# Patient Record
Sex: FEMALE | Race: WHITE | NOT HISPANIC OR LATINO | Employment: FULL TIME | URBAN - METROPOLITAN AREA
[De-identification: names, ages, dates, MRNs, and addresses within clinical notes are randomized per-mention and may not be internally consistent; named-entity substitution may affect disease eponyms.]

---

## 2024-04-08 ENCOUNTER — OFFICE VISIT (OUTPATIENT)
Dept: URGENT CARE | Facility: CLINIC | Age: 59
End: 2024-04-08
Payer: COMMERCIAL

## 2024-04-08 VITALS
DIASTOLIC BLOOD PRESSURE: 80 MMHG | OXYGEN SATURATION: 97 % | BODY MASS INDEX: 34.53 KG/M2 | WEIGHT: 220 LBS | TEMPERATURE: 98 F | HEART RATE: 72 BPM | SYSTOLIC BLOOD PRESSURE: 125 MMHG | HEIGHT: 67 IN | RESPIRATION RATE: 18 BRPM

## 2024-04-08 DIAGNOSIS — J04.0 LARYNGITIS: Primary | ICD-10-CM

## 2024-04-08 LAB — POC RAPID STREP: NEGATIVE

## 2024-04-08 PROCEDURE — 87880 STREP A ASSAY W/OPTIC: CPT | Performed by: PHYSICIAN ASSISTANT

## 2024-04-08 PROCEDURE — 1036F TOBACCO NON-USER: CPT | Performed by: PHYSICIAN ASSISTANT

## 2024-04-08 PROCEDURE — 99204 OFFICE O/P NEW MOD 45 MIN: CPT | Performed by: PHYSICIAN ASSISTANT

## 2024-04-08 RX ORDER — METOPROLOL SUCCINATE 50 MG/1
50 TABLET, EXTENDED RELEASE ORAL DAILY
COMMUNITY

## 2024-04-08 RX ORDER — AMLODIPINE BESYLATE 5 MG/1
5 TABLET ORAL DAILY
COMMUNITY

## 2024-04-08 RX ORDER — METHYLPREDNISOLONE 4 MG/1
TABLET ORAL
Qty: 21 TABLET | Refills: 0 | Status: SHIPPED | OUTPATIENT
Start: 2024-04-08

## 2024-04-08 RX ORDER — BROMPHENIRAMINE MALEATE, PSEUDOEPHEDRINE HYDROCHLORIDE, AND DEXTROMETHORPHAN HYDROBROMIDE 2; 30; 10 MG/5ML; MG/5ML; MG/5ML
10 SYRUP ORAL 3 TIMES DAILY PRN
Qty: 118 ML | Refills: 0 | Status: SHIPPED | OUTPATIENT
Start: 2024-04-08 | End: 2024-04-13

## 2024-04-08 RX ORDER — HYDROCHLOROTHIAZIDE 25 MG/1
25 TABLET ORAL
COMMUNITY
Start: 2022-07-15

## 2024-04-08 ASSESSMENT — PAIN SCALES - GENERAL: PAINLEVEL: 5

## 2024-04-08 NOTE — PROGRESS NOTES
"Subjective   Patient ID: Aidee Stallings is a 58 y.o. female who presents for Cough (Cough x1 week).  Patient notes sore throat loss of voice cough.  She denies any chest pain or shortness of breath any nausea vomiting diarrhea or abdominal pain.  Patient is going on vacation in 5 days and is concerned about being sick on vacation.  Traveling on an extended trip to [place]    No past medical history on file.      The remainder of the systems were reviewed and are negative unless noted above      Objective   /80   Pulse 72   Temp 36.7 °C (98 °F)   Resp 18   Ht 1.702 m (5' 7\")   Wt 99.8 kg (220 lb)   SpO2 97%   BMI 34.46 kg/m²   Physical Exam  Vitals reviewed.   Constitutional:       General: She is not in acute distress.     Appearance: Normal appearance. She is not toxic-appearing.   HENT:      Head: Normocephalic.      Right Ear: Tympanic membrane and ear canal normal. No tenderness. No mastoid tenderness.      Left Ear: Tympanic membrane and ear canal normal. No tenderness. No mastoid tenderness.      Nose: Congestion and rhinorrhea present.      Mouth/Throat:      Mouth: Mucous membranes are moist.      Pharynx: Oropharynx is clear. Posterior oropharyngeal erythema present.   Eyes:      Conjunctiva/sclera: Conjunctivae normal.      Pupils: Pupils are equal, round, and reactive to light.   Cardiovascular:      Rate and Rhythm: Normal rate and regular rhythm.      Heart sounds: No murmur heard.  Pulmonary:      Effort: No respiratory distress.      Breath sounds: No stridor. No wheezing, rhonchi or rales.   Chest:      Chest wall: No tenderness.   Abdominal:      Tenderness: There is no abdominal tenderness. There is no guarding.   Musculoskeletal:         General: Normal range of motion.   Skin:     General: Skin is warm and dry.      Capillary Refill: Capillary refill takes less than 2 seconds.      Findings: No erythema.   Neurological:      General: No focal deficit " present.      Mental Status: She is alert.         Assessment/Plan   Problem List Items Addressed This Visit       Laryngitis - Primary    Relevant Medications    brompheniramine-pseudoeph-DM 2-30-10 mg/5 mL syrup    methylPREDNISolone (Medrol Dospak) 4 mg tablets    Other Relevant Orders    POCT rapid strep A manually resulted      Strep testing is negative  Discussed with patient that acute laryngitis is almost invariably caused by a viral illness.  Given the longevity of her symptoms and pronounced loss of voice I have sent Medrol pack, also sending Bromfed to help with the cough

## 2024-04-08 NOTE — PATIENT INSTRUCTIONS
Assessment/Plan   Problem List Items Addressed This Visit       Laryngitis - Primary    Relevant Medications    brompheniramine-pseudoeph-DM 2-30-10 mg/5 mL syrup    methylPREDNISolone (Medrol Dospak) 4 mg tablets    Other Relevant Orders    POCT rapid strep A manually resulted      Strep testing is negative  Discussed with patient that acute laryngitis is almost invariably caused by a viral illness.  Given the longevity of her symptoms and pronounced loss of voice I have sent Medrol pack, also sending Bromfed to help with the cough

## 2024-05-29 ENCOUNTER — LAB (OUTPATIENT)
Dept: LAB | Facility: LAB | Age: 59
End: 2024-05-29
Payer: COMMERCIAL

## 2024-05-29 DIAGNOSIS — H61.899 OTHER SPECIFIED DISORDERS OF EXTERNAL EAR, UNSPECIFIED EAR: Primary | ICD-10-CM

## 2024-05-29 DIAGNOSIS — R51.9 HEADACHE, UNSPECIFIED: ICD-10-CM

## 2024-05-29 DIAGNOSIS — M27.9 DISEASE OF JAWS, UNSPECIFIED: ICD-10-CM

## 2024-05-29 DIAGNOSIS — R22.0 LOCALIZED SWELLING, MASS AND LUMP, HEAD: ICD-10-CM

## 2024-05-29 LAB
BUN SERPL-MCNC: 10 MG/DL (ref 6–23)
CREAT SERPL-MCNC: 0.72 MG/DL (ref 0.5–1.05)
EGFRCR SERPLBLD CKD-EPI 2021: >90 ML/MIN/1.73M*2

## 2024-05-29 PROCEDURE — 36415 COLL VENOUS BLD VENIPUNCTURE: CPT

## 2024-05-29 PROCEDURE — 82565 ASSAY OF CREATININE: CPT

## 2024-05-29 PROCEDURE — 84520 ASSAY OF UREA NITROGEN: CPT

## 2024-07-01 ENCOUNTER — LAB (OUTPATIENT)
Dept: LAB | Facility: LAB | Age: 59
End: 2024-07-01
Payer: COMMERCIAL

## 2024-07-01 DIAGNOSIS — M27.2 INFLAMMATORY CONDITIONS OF JAWS: Primary | ICD-10-CM

## 2024-07-01 LAB
ANION GAP SERPL CALC-SCNC: 13 MMOL/L (ref 10–20)
BASOPHILS # BLD AUTO: 0.04 X10*3/UL (ref 0–0.1)
BASOPHILS NFR BLD AUTO: 0.5 %
BUN SERPL-MCNC: 15 MG/DL (ref 6–23)
CALCIUM SERPL-MCNC: 9.8 MG/DL (ref 8.6–10.6)
CHLORIDE SERPL-SCNC: 102 MMOL/L (ref 98–107)
CO2 SERPL-SCNC: 29 MMOL/L (ref 21–32)
CREAT SERPL-MCNC: 0.72 MG/DL (ref 0.5–1.05)
EGFRCR SERPLBLD CKD-EPI 2021: >90 ML/MIN/1.73M*2
EOSINOPHIL # BLD AUTO: 0.14 X10*3/UL (ref 0–0.7)
EOSINOPHIL NFR BLD AUTO: 1.8 %
ERYTHROCYTE [DISTWIDTH] IN BLOOD BY AUTOMATED COUNT: 15.8 % (ref 11.5–14.5)
ERYTHROCYTE [SEDIMENTATION RATE] IN BLOOD BY WESTERGREN METHOD: 41 MM/H (ref 0–30)
EST. AVERAGE GLUCOSE BLD GHB EST-MCNC: 108 MG/DL
GLUCOSE SERPL-MCNC: 104 MG/DL (ref 74–99)
HBA1C MFR BLD: 5.4 %
HCT VFR BLD AUTO: 38.3 % (ref 36–46)
HGB BLD-MCNC: 11.9 G/DL (ref 12–16)
IMM GRANULOCYTES # BLD AUTO: 0.01 X10*3/UL (ref 0–0.7)
IMM GRANULOCYTES NFR BLD AUTO: 0.1 % (ref 0–0.9)
LYMPHOCYTES # BLD AUTO: 2.61 X10*3/UL (ref 1.2–4.8)
LYMPHOCYTES NFR BLD AUTO: 33.5 %
MCH RBC QN AUTO: 27.1 PG (ref 26–34)
MCHC RBC AUTO-ENTMCNC: 31.1 G/DL (ref 32–36)
MCV RBC AUTO: 87 FL (ref 80–100)
MONOCYTES # BLD AUTO: 0.51 X10*3/UL (ref 0.1–1)
MONOCYTES NFR BLD AUTO: 6.6 %
NEUTROPHILS # BLD AUTO: 4.47 X10*3/UL (ref 1.2–7.7)
NEUTROPHILS NFR BLD AUTO: 57.5 %
NRBC BLD-RTO: 0 /100 WBCS (ref 0–0)
PLATELET # BLD AUTO: 352 X10*3/UL (ref 150–450)
POTASSIUM SERPL-SCNC: 4.2 MMOL/L (ref 3.5–5.3)
RBC # BLD AUTO: 4.39 X10*6/UL (ref 4–5.2)
SODIUM SERPL-SCNC: 140 MMOL/L (ref 136–145)
WBC # BLD AUTO: 7.8 X10*3/UL (ref 4.4–11.3)

## 2024-07-01 PROCEDURE — 83036 HEMOGLOBIN GLYCOSYLATED A1C: CPT

## 2024-07-01 PROCEDURE — 85652 RBC SED RATE AUTOMATED: CPT

## 2024-07-01 PROCEDURE — 85025 COMPLETE CBC W/AUTO DIFF WBC: CPT

## 2024-07-01 PROCEDURE — 80048 BASIC METABOLIC PNL TOTAL CA: CPT

## 2024-07-01 PROCEDURE — 36415 COLL VENOUS BLD VENIPUNCTURE: CPT

## 2024-07-23 DIAGNOSIS — M27.2 OSTEOMYELITIS, JAW CHRONIC: Primary | ICD-10-CM

## 2024-07-24 ENCOUNTER — HOSPITAL ENCOUNTER (OUTPATIENT)
Dept: RADIOLOGY | Facility: CLINIC | Age: 59
Discharge: HOME | End: 2024-07-24
Payer: COMMERCIAL

## 2024-07-24 DIAGNOSIS — M27.2 OSTEOMYELITIS, JAW CHRONIC: ICD-10-CM

## 2024-07-24 PROCEDURE — 70486 CT MAXILLOFACIAL W/O DYE: CPT | Performed by: STUDENT IN AN ORGANIZED HEALTH CARE EDUCATION/TRAINING PROGRAM

## 2024-07-24 PROCEDURE — 70486 CT MAXILLOFACIAL W/O DYE: CPT

## 2024-08-22 ENCOUNTER — ANESTHESIA EVENT (OUTPATIENT)
Dept: OPERATING ROOM | Facility: HOSPITAL | Age: 59
End: 2024-08-22
Payer: COMMERCIAL

## 2024-08-22 ENCOUNTER — APPOINTMENT (OUTPATIENT)
Dept: RADIOLOGY | Facility: HOSPITAL | Age: 59
End: 2024-08-22
Payer: COMMERCIAL

## 2024-08-22 ENCOUNTER — HOSPITAL ENCOUNTER (INPATIENT)
Facility: HOSPITAL | Age: 59
LOS: 1 days | Discharge: HOME | DRG: 141 | End: 2024-08-23
Attending: DENTIST | Admitting: STUDENT IN AN ORGANIZED HEALTH CARE EDUCATION/TRAINING PROGRAM
Payer: COMMERCIAL

## 2024-08-22 ENCOUNTER — ANESTHESIA (OUTPATIENT)
Dept: OPERATING ROOM | Facility: HOSPITAL | Age: 59
End: 2024-08-22
Payer: COMMERCIAL

## 2024-08-22 DIAGNOSIS — M86.9 OSTEOMYELITIS, UNSPECIFIED SITE, UNSPECIFIED TYPE (MULTI): Primary | ICD-10-CM

## 2024-08-22 LAB
ALBUMIN SERPL BCP-MCNC: 3.8 G/DL (ref 3.4–5)
ANION GAP SERPL CALC-SCNC: 17 MMOL/L (ref 10–20)
BUN SERPL-MCNC: 12 MG/DL (ref 6–23)
CALCIUM SERPL-MCNC: 9 MG/DL (ref 8.6–10.6)
CHLORIDE SERPL-SCNC: 100 MMOL/L (ref 98–107)
CO2 SERPL-SCNC: 25 MMOL/L (ref 21–32)
CREAT SERPL-MCNC: 0.77 MG/DL (ref 0.5–1.05)
EGFRCR SERPLBLD CKD-EPI 2021: 90 ML/MIN/1.73M*2
ERYTHROCYTE [DISTWIDTH] IN BLOOD BY AUTOMATED COUNT: 14.3 % (ref 11.5–14.5)
GLUCOSE SERPL-MCNC: 120 MG/DL (ref 74–99)
HCT VFR BLD AUTO: 36.4 % (ref 36–46)
HGB BLD-MCNC: 11.5 G/DL (ref 12–16)
MCH RBC QN AUTO: 26.7 PG (ref 26–34)
MCHC RBC AUTO-ENTMCNC: 31.6 G/DL (ref 32–36)
MCV RBC AUTO: 85 FL (ref 80–100)
NRBC BLD-RTO: 0 /100 WBCS (ref 0–0)
PHOSPHATE SERPL-MCNC: 4 MG/DL (ref 2.5–4.9)
PLATELET # BLD AUTO: 348 X10*3/UL (ref 150–450)
POTASSIUM SERPL-SCNC: 4.5 MMOL/L (ref 3.5–5.3)
RBC # BLD AUTO: 4.3 X10*6/UL (ref 4–5.2)
SODIUM SERPL-SCNC: 137 MMOL/L (ref 136–145)
WBC # BLD AUTO: 16.7 X10*3/UL (ref 4.4–11.3)

## 2024-08-22 PROCEDURE — 3700000002 HC GENERAL ANESTHESIA TIME - EACH INCREMENTAL 1 MINUTE: Performed by: DENTIST

## 2024-08-22 PROCEDURE — 2500000004 HC RX 250 GENERAL PHARMACY W/ HCPCS (ALT 636 FOR OP/ED): Performed by: DENTIST

## 2024-08-22 PROCEDURE — 70355 PANORAMIC X-RAY OF JAWS: CPT | Performed by: STUDENT IN AN ORGANIZED HEALTH CARE EDUCATION/TRAINING PROGRAM

## 2024-08-22 PROCEDURE — 2500000001 HC RX 250 WO HCPCS SELF ADMINISTERED DRUGS (ALT 637 FOR MEDICARE OP): Performed by: STUDENT IN AN ORGANIZED HEALTH CARE EDUCATION/TRAINING PROGRAM

## 2024-08-22 PROCEDURE — 80069 RENAL FUNCTION PANEL: CPT | Performed by: STUDENT IN AN ORGANIZED HEALTH CARE EDUCATION/TRAINING PROGRAM

## 2024-08-22 PROCEDURE — 86901 BLOOD TYPING SEROLOGIC RH(D): CPT | Performed by: STUDENT IN AN ORGANIZED HEALTH CARE EDUCATION/TRAINING PROGRAM

## 2024-08-22 PROCEDURE — 2500000004 HC RX 250 GENERAL PHARMACY W/ HCPCS (ALT 636 FOR OP/ED): Performed by: STUDENT IN AN ORGANIZED HEALTH CARE EDUCATION/TRAINING PROGRAM

## 2024-08-22 PROCEDURE — 2500000005 HC RX 250 GENERAL PHARMACY W/O HCPCS: Performed by: DENTIST

## 2024-08-22 PROCEDURE — 88305 TISSUE EXAM BY PATHOLOGIST: CPT | Performed by: STUDENT IN AN ORGANIZED HEALTH CARE EDUCATION/TRAINING PROGRAM

## 2024-08-22 PROCEDURE — 70355 PANORAMIC X-RAY OF JAWS: CPT

## 2024-08-22 PROCEDURE — 2780000003 HC OR 278 NO HCPCS: Performed by: DENTIST

## 2024-08-22 PROCEDURE — 7100000002 HC RECOVERY ROOM TIME - EACH INCREMENTAL 1 MINUTE: Performed by: DENTIST

## 2024-08-22 PROCEDURE — 2500000005 HC RX 250 GENERAL PHARMACY W/O HCPCS

## 2024-08-22 PROCEDURE — 00QM0ZZ REPAIR FACIAL NERVE, OPEN APPROACH: ICD-10-PCS | Performed by: DENTIST

## 2024-08-22 PROCEDURE — 87077 CULTURE AEROBIC IDENTIFY: CPT | Performed by: DENTIST

## 2024-08-22 PROCEDURE — C1729 CATH, DRAINAGE: HCPCS | Performed by: DENTIST

## 2024-08-22 PROCEDURE — 1100000001 HC PRIVATE ROOM DAILY

## 2024-08-22 PROCEDURE — 2500000001 HC RX 250 WO HCPCS SELF ADMINISTERED DRUGS (ALT 637 FOR MEDICARE OP)

## 2024-08-22 PROCEDURE — 3700000001 HC GENERAL ANESTHESIA TIME - INITIAL BASE CHARGE: Performed by: DENTIST

## 2024-08-22 PROCEDURE — 0NHV04Z INSERTION OF INTERNAL FIXATION DEVICE INTO LEFT MANDIBLE, OPEN APPROACH: ICD-10-PCS | Performed by: DENTIST

## 2024-08-22 PROCEDURE — 36415 COLL VENOUS BLD VENIPUNCTURE: CPT | Performed by: STUDENT IN AN ORGANIZED HEALTH CARE EDUCATION/TRAINING PROGRAM

## 2024-08-22 PROCEDURE — 7100000001 HC RECOVERY ROOM TIME - INITIAL BASE CHARGE: Performed by: DENTIST

## 2024-08-22 PROCEDURE — 2500000004 HC RX 250 GENERAL PHARMACY W/ HCPCS (ALT 636 FOR OP/ED): Performed by: ANESTHESIOLOGY

## 2024-08-22 PROCEDURE — 87116 MYCOBACTERIA CULTURE: CPT | Performed by: DENTIST

## 2024-08-22 PROCEDURE — 87102 FUNGUS ISOLATION CULTURE: CPT | Performed by: DENTIST

## 2024-08-22 PROCEDURE — 88305 TISSUE EXAM BY PATHOLOGIST: CPT | Mod: TC,SUR | Performed by: DENTIST

## 2024-08-22 PROCEDURE — 85027 COMPLETE CBC AUTOMATED: CPT | Performed by: STUDENT IN AN ORGANIZED HEALTH CARE EDUCATION/TRAINING PROGRAM

## 2024-08-22 PROCEDURE — C9353 NEURAWRAP NERVE PROTECTOR,CM: HCPCS | Performed by: DENTIST

## 2024-08-22 PROCEDURE — C1776 JOINT DEVICE (IMPLANTABLE): HCPCS | Performed by: DENTIST

## 2024-08-22 PROCEDURE — 3600000008 HC OR TIME - EACH INCREMENTAL 1 MINUTE - PROCEDURE LEVEL THREE: Performed by: DENTIST

## 2024-08-22 PROCEDURE — 2720000007 HC OR 272 NO HCPCS: Performed by: DENTIST

## 2024-08-22 PROCEDURE — 3600000003 HC OR TIME - INITIAL BASE CHARGE - PROCEDURE LEVEL THREE: Performed by: DENTIST

## 2024-08-22 PROCEDURE — 2500000004 HC RX 250 GENERAL PHARMACY W/ HCPCS (ALT 636 FOR OP/ED)

## 2024-08-22 PROCEDURE — 0NTV0ZZ RESECTION OF LEFT MANDIBLE, OPEN APPROACH: ICD-10-PCS | Performed by: DENTIST

## 2024-08-22 PROCEDURE — 88311 DECALCIFY TISSUE: CPT | Performed by: STUDENT IN AN ORGANIZED HEALTH CARE EDUCATION/TRAINING PROGRAM

## 2024-08-22 PROCEDURE — 2500000001 HC RX 250 WO HCPCS SELF ADMINISTERED DRUGS (ALT 637 FOR MEDICARE OP): Performed by: DENTIST

## 2024-08-22 DEVICE — DRILL, KLSM 2.2: Type: IMPLANTABLE DEVICE | Site: MANDIBLE | Status: NON-FUNCTIONAL

## 2024-08-22 DEVICE — IMPLANTABLE DEVICE: Type: IMPLANTABLE DEVICE | Site: MANDIBLE | Status: FUNCTIONAL

## 2024-08-22 RX ORDER — MIDAZOLAM HYDROCHLORIDE 1 MG/ML
INJECTION INTRAMUSCULAR; INTRAVENOUS AS NEEDED
Status: DISCONTINUED | OUTPATIENT
Start: 2024-08-22 | End: 2024-08-22

## 2024-08-22 RX ORDER — IBUPROFEN 600 MG/1
600 TABLET ORAL EVERY 6 HOURS PRN
Status: DISCONTINUED | OUTPATIENT
Start: 2024-08-22 | End: 2024-08-24 | Stop reason: HOSPADM

## 2024-08-22 RX ORDER — LIDOCAINE HYDROCHLORIDE 10 MG/ML
INJECTION INFILTRATION; PERINEURAL AS NEEDED
Status: DISCONTINUED | OUTPATIENT
Start: 2024-08-22 | End: 2024-08-22

## 2024-08-22 RX ORDER — CEFAZOLIN 1 G/1
INJECTION, POWDER, FOR SOLUTION INTRAVENOUS AS NEEDED
Status: DISCONTINUED | OUTPATIENT
Start: 2024-08-22 | End: 2024-08-22

## 2024-08-22 RX ORDER — METOPROLOL SUCCINATE 25 MG/1
50 TABLET, EXTENDED RELEASE ORAL DAILY
Status: DISCONTINUED | OUTPATIENT
Start: 2024-08-23 | End: 2024-08-24 | Stop reason: HOSPADM

## 2024-08-22 RX ORDER — OXYCODONE HYDROCHLORIDE 5 MG/1
5 TABLET ORAL EVERY 6 HOURS PRN
Status: DISCONTINUED | OUTPATIENT
Start: 2024-08-22 | End: 2024-08-24 | Stop reason: HOSPADM

## 2024-08-22 RX ORDER — NALOXONE HYDROCHLORIDE 0.4 MG/ML
0.2 INJECTION, SOLUTION INTRAMUSCULAR; INTRAVENOUS; SUBCUTANEOUS EVERY 5 MIN PRN
Status: DISCONTINUED | OUTPATIENT
Start: 2024-08-22 | End: 2024-08-24 | Stop reason: HOSPADM

## 2024-08-22 RX ORDER — HYDROMORPHONE HYDROCHLORIDE 1 MG/ML
0.5 INJECTION, SOLUTION INTRAMUSCULAR; INTRAVENOUS; SUBCUTANEOUS EVERY 5 MIN PRN
Status: DISCONTINUED | OUTPATIENT
Start: 2024-08-22 | End: 2024-08-22 | Stop reason: HOSPADM

## 2024-08-22 RX ORDER — ONDANSETRON HYDROCHLORIDE 2 MG/ML
4 INJECTION, SOLUTION INTRAVENOUS EVERY 8 HOURS PRN
Status: DISCONTINUED | OUTPATIENT
Start: 2024-08-22 | End: 2024-08-24 | Stop reason: HOSPADM

## 2024-08-22 RX ORDER — SUCCINYLCHOLINE CHLORIDE 20 MG/ML
INJECTION INTRAMUSCULAR; INTRAVENOUS AS NEEDED
Status: DISCONTINUED | OUTPATIENT
Start: 2024-08-22 | End: 2024-08-22

## 2024-08-22 RX ORDER — PROCHLORPERAZINE MALEATE 10 MG
10 TABLET ORAL EVERY 6 HOURS PRN
Status: DISCONTINUED | OUTPATIENT
Start: 2024-08-22 | End: 2024-08-24 | Stop reason: HOSPADM

## 2024-08-22 RX ORDER — PROPOFOL 10 MG/ML
INJECTION, EMULSION INTRAVENOUS AS NEEDED
Status: DISCONTINUED | OUTPATIENT
Start: 2024-08-22 | End: 2024-08-22

## 2024-08-22 RX ORDER — HYDROMORPHONE HYDROCHLORIDE 1 MG/ML
INJECTION, SOLUTION INTRAMUSCULAR; INTRAVENOUS; SUBCUTANEOUS AS NEEDED
Status: DISCONTINUED | OUTPATIENT
Start: 2024-08-22 | End: 2024-08-22

## 2024-08-22 RX ORDER — ONDANSETRON 4 MG/1
4 TABLET, FILM COATED ORAL EVERY 8 HOURS PRN
Status: DISCONTINUED | OUTPATIENT
Start: 2024-08-22 | End: 2024-08-24 | Stop reason: HOSPADM

## 2024-08-22 RX ORDER — DEXAMETHASONE SODIUM PHOSPHATE 10 MG/ML
8 INJECTION INTRAMUSCULAR; INTRAVENOUS EVERY 8 HOURS SCHEDULED
Status: DISCONTINUED | OUTPATIENT
Start: 2024-08-22 | End: 2024-08-24 | Stop reason: HOSPADM

## 2024-08-22 RX ORDER — SODIUM CHLORIDE, SODIUM LACTATE, POTASSIUM CHLORIDE, CALCIUM CHLORIDE 600; 310; 30; 20 MG/100ML; MG/100ML; MG/100ML; MG/100ML
100 INJECTION, SOLUTION INTRAVENOUS CONTINUOUS
Status: DISCONTINUED | OUTPATIENT
Start: 2024-08-22 | End: 2024-08-22 | Stop reason: HOSPADM

## 2024-08-22 RX ORDER — OXYCODONE HYDROCHLORIDE 5 MG/1
5 TABLET ORAL EVERY 4 HOURS PRN
Status: DISCONTINUED | OUTPATIENT
Start: 2024-08-22 | End: 2024-08-22 | Stop reason: HOSPADM

## 2024-08-22 RX ORDER — OXYCODONE HYDROCHLORIDE 5 MG/1
10 TABLET ORAL EVERY 4 HOURS PRN
Status: DISCONTINUED | OUTPATIENT
Start: 2024-08-22 | End: 2024-08-24 | Stop reason: HOSPADM

## 2024-08-22 RX ORDER — GLYCOPYRROLATE 0.2 MG/ML
INJECTION INTRAMUSCULAR; INTRAVENOUS AS NEEDED
Status: DISCONTINUED | OUTPATIENT
Start: 2024-08-22 | End: 2024-08-22

## 2024-08-22 RX ORDER — FENTANYL CITRATE 50 UG/ML
INJECTION, SOLUTION INTRAMUSCULAR; INTRAVENOUS AS NEEDED
Status: DISCONTINUED | OUTPATIENT
Start: 2024-08-22 | End: 2024-08-22

## 2024-08-22 RX ORDER — SODIUM CHLORIDE 0.9 G/100ML
IRRIGANT IRRIGATION AS NEEDED
Status: DISCONTINUED | OUTPATIENT
Start: 2024-08-22 | End: 2024-08-22 | Stop reason: HOSPADM

## 2024-08-22 RX ORDER — SODIUM CHLORIDE, SODIUM LACTATE, POTASSIUM CHLORIDE, CALCIUM CHLORIDE 600; 310; 30; 20 MG/100ML; MG/100ML; MG/100ML; MG/100ML
INJECTION, SOLUTION INTRAVENOUS CONTINUOUS PRN
Status: DISCONTINUED | OUTPATIENT
Start: 2024-08-22 | End: 2024-08-22

## 2024-08-22 RX ORDER — TRIPROLIDINE/PSEUDOEPHEDRINE 2.5MG-60MG
600 TABLET ORAL EVERY 6 HOURS PRN
Status: DISCONTINUED | OUTPATIENT
Start: 2024-08-22 | End: 2024-08-24 | Stop reason: HOSPADM

## 2024-08-22 RX ORDER — ENOXAPARIN SODIUM 100 MG/ML
40 INJECTION SUBCUTANEOUS ONCE
Status: COMPLETED | OUTPATIENT
Start: 2024-08-22 | End: 2024-08-22

## 2024-08-22 RX ORDER — ACETAMINOPHEN 325 MG/1
650 TABLET ORAL EVERY 6 HOURS
Status: DISCONTINUED | OUTPATIENT
Start: 2024-08-22 | End: 2024-08-24 | Stop reason: HOSPADM

## 2024-08-22 RX ORDER — PROCHLORPERAZINE EDISYLATE 5 MG/ML
10 INJECTION INTRAMUSCULAR; INTRAVENOUS EVERY 6 HOURS PRN
Status: DISCONTINUED | OUTPATIENT
Start: 2024-08-22 | End: 2024-08-24 | Stop reason: HOSPADM

## 2024-08-22 RX ORDER — CHLORHEXIDINE GLUCONATE ORAL RINSE 1.2 MG/ML
15 SOLUTION DENTAL 3 TIMES DAILY
Status: DISCONTINUED | OUTPATIENT
Start: 2024-08-22 | End: 2024-08-24 | Stop reason: HOSPADM

## 2024-08-22 RX ORDER — LIDOCAINE HYDROCHLORIDE 10 MG/ML
0.1 INJECTION INFILTRATION; PERINEURAL ONCE
Status: DISCONTINUED | OUTPATIENT
Start: 2024-08-22 | End: 2024-08-22 | Stop reason: HOSPADM

## 2024-08-22 RX ORDER — SODIUM CHLORIDE, SODIUM LACTATE, POTASSIUM CHLORIDE, CALCIUM CHLORIDE 600; 310; 30; 20 MG/100ML; MG/100ML; MG/100ML; MG/100ML
75 INJECTION, SOLUTION INTRAVENOUS CONTINUOUS
Status: DISCONTINUED | OUTPATIENT
Start: 2024-08-22 | End: 2024-08-24 | Stop reason: HOSPADM

## 2024-08-22 RX ORDER — OXYCODONE HYDROCHLORIDE 5 MG/1
10 TABLET ORAL EVERY 4 HOURS PRN
Status: DISCONTINUED | OUTPATIENT
Start: 2024-08-22 | End: 2024-08-22 | Stop reason: HOSPADM

## 2024-08-22 RX ORDER — HYDROMORPHONE HYDROCHLORIDE 1 MG/ML
0.2 INJECTION, SOLUTION INTRAMUSCULAR; INTRAVENOUS; SUBCUTANEOUS EVERY 5 MIN PRN
Status: DISCONTINUED | OUTPATIENT
Start: 2024-08-22 | End: 2024-08-22 | Stop reason: HOSPADM

## 2024-08-22 RX ORDER — OXYMETAZOLINE HCL 0.05 %
2 SPRAY, NON-AEROSOL (ML) NASAL EVERY 12 HOURS PRN
Status: DISCONTINUED | OUTPATIENT
Start: 2024-08-22 | End: 2024-08-24 | Stop reason: HOSPADM

## 2024-08-22 RX ORDER — ONDANSETRON HYDROCHLORIDE 2 MG/ML
4 INJECTION, SOLUTION INTRAVENOUS ONCE AS NEEDED
Status: DISCONTINUED | OUTPATIENT
Start: 2024-08-22 | End: 2024-08-22 | Stop reason: HOSPADM

## 2024-08-22 RX ORDER — ACETAMINOPHEN 160 MG/5ML
650 SOLUTION ORAL EVERY 6 HOURS
Status: DISCONTINUED | OUTPATIENT
Start: 2024-08-22 | End: 2024-08-24 | Stop reason: HOSPADM

## 2024-08-22 RX ORDER — BACITRACIN ZINC 500 UNIT/G
OINTMENT IN PACKET (EA) TOPICAL AS NEEDED
Status: DISCONTINUED | OUTPATIENT
Start: 2024-08-22 | End: 2024-08-22 | Stop reason: HOSPADM

## 2024-08-22 RX ORDER — ONDANSETRON HYDROCHLORIDE 2 MG/ML
INJECTION, SOLUTION INTRAVENOUS AS NEEDED
Status: DISCONTINUED | OUTPATIENT
Start: 2024-08-22 | End: 2024-08-22

## 2024-08-22 RX ORDER — ENOXAPARIN SODIUM 100 MG/ML
40 INJECTION SUBCUTANEOUS EVERY 24 HOURS
Status: DISCONTINUED | OUTPATIENT
Start: 2024-08-23 | End: 2024-08-24 | Stop reason: HOSPADM

## 2024-08-22 RX ORDER — PROCHLORPERAZINE 25 MG/1
25 SUPPOSITORY RECTAL EVERY 12 HOURS PRN
Status: DISCONTINUED | OUTPATIENT
Start: 2024-08-22 | End: 2024-08-24 | Stop reason: HOSPADM

## 2024-08-22 RX ORDER — OXYMETAZOLINE HCL 0.05 %
SPRAY, NON-AEROSOL (ML) NASAL AS NEEDED
Status: DISCONTINUED | OUTPATIENT
Start: 2024-08-22 | End: 2024-08-22

## 2024-08-22 RX ORDER — SODIUM CHLORIDE 9 MG/ML
INJECTION, SOLUTION INTRAVENOUS CONTINUOUS PRN
Status: COMPLETED | OUTPATIENT
Start: 2024-08-22 | End: 2024-08-22

## 2024-08-22 SDOH — SOCIAL STABILITY: SOCIAL INSECURITY: DO YOU FEEL UNSAFE GOING BACK TO THE PLACE WHERE YOU ARE LIVING?: NO

## 2024-08-22 SDOH — HEALTH STABILITY: MENTAL HEALTH: CURRENT SMOKER: 0

## 2024-08-22 SDOH — SOCIAL STABILITY: SOCIAL INSECURITY: ABUSE: ADULT

## 2024-08-22 SDOH — SOCIAL STABILITY: SOCIAL INSECURITY: DOES ANYONE TRY TO KEEP YOU FROM HAVING/CONTACTING OTHER FRIENDS OR DOING THINGS OUTSIDE YOUR HOME?: NO

## 2024-08-22 SDOH — SOCIAL STABILITY: SOCIAL INSECURITY: HAVE YOU HAD ANY THOUGHTS OF HARMING ANYONE ELSE?: NO

## 2024-08-22 SDOH — SOCIAL STABILITY: SOCIAL INSECURITY: DO YOU FEEL ANYONE HAS EXPLOITED OR TAKEN ADVANTAGE OF YOU FINANCIALLY OR OF YOUR PERSONAL PROPERTY?: NO

## 2024-08-22 SDOH — SOCIAL STABILITY: SOCIAL INSECURITY: HAS ANYONE EVER THREATENED TO HURT YOUR FAMILY OR YOUR PETS?: NO

## 2024-08-22 SDOH — SOCIAL STABILITY: SOCIAL INSECURITY: ARE YOU OR HAVE YOU BEEN THREATENED OR ABUSED PHYSICALLY, EMOTIONALLY, OR SEXUALLY BY ANYONE?: NO

## 2024-08-22 SDOH — SOCIAL STABILITY: SOCIAL INSECURITY: HAVE YOU HAD THOUGHTS OF HARMING ANYONE ELSE?: NO

## 2024-08-22 SDOH — SOCIAL STABILITY: SOCIAL INSECURITY: ARE THERE ANY APPARENT SIGNS OF INJURIES/BEHAVIORS THAT COULD BE RELATED TO ABUSE/NEGLECT?: NO

## 2024-08-22 SDOH — SOCIAL STABILITY: SOCIAL INSECURITY: WERE YOU ABLE TO COMPLETE ALL THE BEHAVIORAL HEALTH SCREENINGS?: YES

## 2024-08-22 ASSESSMENT — LIFESTYLE VARIABLES
HOW OFTEN DO YOU HAVE 6 OR MORE DRINKS ON ONE OCCASION: NEVER
HOW MANY STANDARD DRINKS CONTAINING ALCOHOL DO YOU HAVE ON A TYPICAL DAY: PATIENT DOES NOT DRINK
HOW OFTEN DO YOU HAVE A DRINK CONTAINING ALCOHOL: NEVER
AUDIT-C TOTAL SCORE: 0
AUDIT-C TOTAL SCORE: 0
SKIP TO QUESTIONS 9-10: 1

## 2024-08-22 ASSESSMENT — COGNITIVE AND FUNCTIONAL STATUS - GENERAL
MOBILITY SCORE: 24
MOBILITY SCORE: 24
PATIENT BASELINE BEDBOUND: NO
DAILY ACTIVITIY SCORE: 24
DAILY ACTIVITIY SCORE: 24

## 2024-08-22 ASSESSMENT — PAIN - FUNCTIONAL ASSESSMENT
PAIN_FUNCTIONAL_ASSESSMENT: 0-10
PAIN_FUNCTIONAL_ASSESSMENT: UNABLE TO SELF-REPORT
PAIN_FUNCTIONAL_ASSESSMENT: 0-10
PAIN_FUNCTIONAL_ASSESSMENT: UNABLE TO SELF-REPORT
PAIN_FUNCTIONAL_ASSESSMENT: 0-10
PAIN_FUNCTIONAL_ASSESSMENT: UNABLE TO SELF-REPORT
PAIN_FUNCTIONAL_ASSESSMENT: 0-10
PAIN_FUNCTIONAL_ASSESSMENT: UNABLE TO SELF-REPORT
PAIN_FUNCTIONAL_ASSESSMENT: UNABLE TO SELF-REPORT
PAIN_FUNCTIONAL_ASSESSMENT: 0-10
PAIN_FUNCTIONAL_ASSESSMENT: UNABLE TO SELF-REPORT

## 2024-08-22 ASSESSMENT — COLUMBIA-SUICIDE SEVERITY RATING SCALE - C-SSRS
1. IN THE PAST MONTH, HAVE YOU WISHED YOU WERE DEAD OR WISHED YOU COULD GO TO SLEEP AND NOT WAKE UP?: NO
6. HAVE YOU EVER DONE ANYTHING, STARTED TO DO ANYTHING, OR PREPARED TO DO ANYTHING TO END YOUR LIFE?: NO
2. HAVE YOU ACTUALLY HAD ANY THOUGHTS OF KILLING YOURSELF?: NO

## 2024-08-22 ASSESSMENT — PATIENT HEALTH QUESTIONNAIRE - PHQ9
1. LITTLE INTEREST OR PLEASURE IN DOING THINGS: NOT AT ALL
2. FEELING DOWN, DEPRESSED OR HOPELESS: NOT AT ALL
SUM OF ALL RESPONSES TO PHQ9 QUESTIONS 1 & 2: 0

## 2024-08-22 ASSESSMENT — ACTIVITIES OF DAILY LIVING (ADL)
HEARING - LEFT EAR: FUNCTIONAL
FEEDING YOURSELF: INDEPENDENT
HEARING - RIGHT EAR: FUNCTIONAL
TOILETING: INDEPENDENT
LACK_OF_TRANSPORTATION: NO
JUDGMENT_ADEQUATE_SAFELY_COMPLETE_DAILY_ACTIVITIES: YES
WALKS IN HOME: INDEPENDENT
ADEQUATE_TO_COMPLETE_ADL: YES
DRESSING YOURSELF: INDEPENDENT
BATHING: INDEPENDENT
PATIENT'S MEMORY ADEQUATE TO SAFELY COMPLETE DAILY ACTIVITIES?: YES
GROOMING: INDEPENDENT

## 2024-08-22 ASSESSMENT — PAIN SCALES - GENERAL
PAINLEVEL_OUTOF10: 2
PAINLEVEL_OUTOF10: 0 - NO PAIN
PAINLEVEL_OUTOF10: 0 - NO PAIN
PAINLEVEL_OUTOF10: 6
PAINLEVEL_OUTOF10: 4
PAINLEVEL_OUTOF10: 2
PAINLEVEL_OUTOF10: 0 - NO PAIN
PAINLEVEL_OUTOF10: 4
PAINLEVEL_OUTOF10: 5 - MODERATE PAIN
PAINLEVEL_OUTOF10: 2

## 2024-08-22 NOTE — ANESTHESIA PREPROCEDURE EVALUATION
Patient: Aidee Stallings    Procedure Information       Date/Time: 08/22/24 0715    Procedures:       Excision Bone Face (Bilateral)      Mandible Transmandibular Implant Repair (Bilateral)    Location: [facility] OR 15 [facility] [place]    Surgeons: Rahul Saleh DDS            Relevant Problems   No relevant active problems       Clinical information reviewed:   Tobacco  Allergies  Meds   Med Hx  Surg Hx  OB Status  Fam Hx  Soc   Hx        NPO Detail:  NPO/Void Status  Date of Last Liquid: 08/22/24  Time of Last Liquid: 0430  Date of Last Solid: 08/21/24  Time of Last Solid: 2345         Physical Exam    Airway  Mallampati: I  TM distance: >3 FB     Cardiovascular   Rhythm: regular     Dental    Pulmonary   Breath sounds clear to auscultation     Abdominal            Anesthesia Plan    History of general anesthesia?: no  History of complications of general anesthesia?: no    ASA 2     general     The patient is not a current smoker.    intravenous induction   Anesthetic plan and risks discussed with spouse.

## 2024-08-22 NOTE — BRIEF OP NOTE
Date: 2024  OR Location: [facility] OR    Name: Aidee Stallings, : 1965, Age: 59 y.o., MRN: [id number], Sex: female    Diagnosis  Pre-op Diagnosis      * Osteomyelitis, unspecified site, unspecified type (Multi) [M86.9] Post-op Diagnosis     * Osteomyelitis, unspecified site, unspecified type (Multi) [M86.9]     Procedures  Excision Bone Face   - IL EXCISION BONE MANDIBLE    Mandible Transmandibular Implant Repair   - IL RCNSTJ MNDBL XTRORAL W/TRANSOSTEAL BONE PLATE    53919 - Neuroplasty and/or transposition, cranial nerve, except facial nerve    46244 - Nerve wrap, other nerve procedure    Surgeons      * Rahul Saleh - Primary    Resident/Fellow/Other Assistant:  Surgeons and Role:     * Beth Giraldo DMD, MD - Resident - Assisting     * Serafin Cr MD, DDS - Resident - Assisting    Procedure Summary  Anesthesia: General  ASA: II  Anesthesia Staff: Anesthesiologist: Cecilio Culp MD  C-AA: MARVEL Stovall  Estimated Blood Loss: 75mL  Intra-op Medications:   Administrations occurring from 0715 to 1050 on 24:   Medication Name Total Dose   sodium chloride 0.9 % irrigation solution 1,000 mL   sodium chloride 0.9% infusion 130.83 mL   thrombin (Human)-fibrinogen-aprotinin-calcium (Tisseel) 10 mL 10 mL              Anesthesia Record               Intraprocedure I/O Totals          Intake    Remifentanil Drip 0.00 mL    The total shown is the total volume documented since Anesthesia Start was filed.    Total Intake 0 mL          Specimen:   ID Type Source Tests Collected by Time   1 : NECROTIC BONE LEFT MANDIBE Tissue BONE RESECTION SURGICAL PATHOLOGY EXAM Rahul Saleh DDS 2024 0858   A : NECROTIC BONE LEFT MANDIBLE Tissue MANDIBLE LEFT BIOPSY AFB CULTURE/SMEAR, FUNGAL CULTURE/SMEAR, TISSUE/WOUND CULTURE/SMEAR Rahul Saleh DDS 2024 0836        Staff:   Circulator: Mira Iqbal Person: Sandrine          Findings: Necrotic bone of left mandible involving teeth #19, 20,  nerve lateralized and wrapped.    Complications:  None; patient tolerated the procedure well.     Disposition: PACU - hemodynamically stable.  Condition: stable  Specimens Collected:   ID Type Source Tests Collected by Time   1 : NECROTIC BONE LEFT MANDIBE Tissue BONE RESECTION SURGICAL PATHOLOGY EXAM Rahul Saleh DDS 8/22/2024 0030   A : NECROTIC BONE LEFT MANDIBLE Tissue MANDIBLE LEFT BIOPSY AFB CULTURE/SMEAR, FUNGAL CULTURE/SMEAR, TISSUE/WOUND CULTURE/SMEAR Rahul Saleh DDS 8/22/2024 0036     Attending Attestation:     Rahul Saleh  Phone Number: [phone]

## 2024-08-22 NOTE — CARE PLAN
The patient's goals for the shift include get home    The clinical goals for the shift include pain control and safety

## 2024-08-22 NOTE — ANESTHESIA POSTPROCEDURE EVALUATION
Patient: Aidee Stallings    Procedure Summary       Date: 08/22/24 Room / Location: [facility] OR 15 / [facility]    Anesthesia Start: 0719 Anesthesia Stop: 1159    Procedures:       Excision Bone Face (Bilateral)      Mandible Transmandibular Implant Repair (Bilateral) Diagnosis:       Osteomyelitis, unspecified site, unspecified type (Multi)      (Osteomyelitis Unspecified [M86.9])    Surgeons: Rahul Saleh DDS Responsible Provider: Cecilio Culp MD    Anesthesia Type: general ASA Status: 2            Anesthesia Type: general    Vitals Value Taken Time   /66 08/22/24 1147   Temp 37.1 °C (98.8 °F) 08/22/24 1147   Pulse 89 08/22/24 1152   Resp 13 08/22/24 1152   SpO2 94 % 08/22/24 1152   Vitals shown include unfiled device data.    Anesthesia Post Evaluation    Patient location during evaluation: PACU  Patient participation: complete - patient participated  Level of consciousness: awake and alert  Pain management: adequate  Airway patency: patent  Cardiovascular status: acceptable  Respiratory status: acceptable  Hydration status: acceptable  Postoperative Nausea and Vomiting: none        There were no known notable events for this encounter.

## 2024-08-22 NOTE — H&P
"History Of Present Illness  Aidee Stallings is a 59 y.o. female presenting with left mandibular osteomyelitis. Pt presented for evaluation of numbness and swelling on left side of chin. Conducted further evaluation with CT. CT from 5/3/24 was non-diagnostic for osteomyelitis due to large slices. However, pano from 6/27/24 showed osteomyelitis. Pt also had left-sided swelling and paraesthesia consistent with osteomyelitis.     Past Medical History  Past Medical History:   Diagnosis Date    Hypertension        Surgical History  Past Surgical History:   Procedure Laterality Date    ANKLE SURGERY Right     COLONOSCOPY          Social History  She reports that she quit smoking about 30 years ago. Her smoking use included cigarettes. She has never used smokeless tobacco. She reports current alcohol use. She reports that she does not use drugs.    Family History  No family history on file.     Allergies  Patient has no known allergies.    Review of Systems     10- point review of systems discussed with pt. Pertinent positive and negatives as per HPI    Physical Exam  HENT:      Head: Normocephalic and atraumatic.      Comments: CN VII intact b/l  R CN V3 intact, hypoesthesia of L CN V3  No bony defects intraorally  No sinus tract appreciated  intraorally  Slight left submandibular swelling    Cardiovascular:      Comments: Pt is warm and well-perfused.  Pulmonary:      Comments: Breathing comfortably on room air  Skin:     General: Skin is warm and dry.   Neurological:      General: No focal deficit present.      Mental Status: She is alert.   Psychiatric:         Mood and Affect: Mood normal.         Behavior: Behavior normal.          Last Recorded Vitals  Pulse 89, temperature 37.1 °C (98.8 °F), temperature source Temporal, resp. rate 16, height 1.702 m (5' 7\"), weight 96.5 kg (212 lb 11.9 oz), SpO2 98%.    Relevant Results  FINDINGS:  There is diffuse osseous erosion extending from the left  parasymphyseal region " of the mandible, extending along the mandibular  body to the level of the mandibular angle. Erosion is most prominent  along the buccal aspect of the mandible. Multiple dental caries are  noted, most prominent at the left 3rd mandibular molar. Findings are  compatible with mandibular osteomyelitis, likely odontogenic in  etiology. There is mild surrounding soft tissue stranding with  thickening of the left platysma muscle. However, there is no  well-defined fluid collection, to suggest a drainable abscess.      Remainder of the facial bones are unremarkable. The bony orbits are  intact. The orbital contents are unremarkable. Temporomandibular  joints are unremarkable.      Visualized paranasal sinuses are clear. Mastoids are clear.      Limited assessment of intracranial structures demonstrates no acute  intracranial process.      IMPRESSION:  Left mandibular osteomyelitis, likely odontogenic in etiology.  Adjacent phlegmonous change without evidence of a drainable abscess.         Assessment/Plan   Assessment & Plan      Assessment:   Patient is a 59 year old female with no signifIcant PMH with pano and clinical exam concerning for osteomyelitis  of the left mandible. Noted hypoesthesia of left V3.    Plan   Debridement vs resection of left mandible, possible nerve lateralization, possible nerve allograft, extraction of teeth (#18,19,20), placement of plates and screws with Dr. Saleh on 8/22/24.           Grace Steel DDS  [facility] [place] PGY-1  Pager : [phone]

## 2024-08-22 NOTE — OP NOTE
Excision Bone Face (B), Mandible Transmandibular Implant Repair (B) Operative Note     Date: 2024  OR Location: [facility] OR    Name: Aidee Stallings, : 1965, Age: 59 y.o., MRN: [id number], Sex: female    Diagnosis  Pre-op Diagnosis      * Osteomyelitis, unspecified site, unspecified type (Multi) [M86.9] Post-op Diagnosis     * Osteomyelitis, unspecified site, unspecified type (Multi) [M86.9]     Procedures  Excision Bone Face   - VA EXCISION BONE MANDIBLE    Mandible Transmandibular Implant Repair   - VA RCNSTJ MNDBL XTRORAL W/TRANSOSTEAL BONE PLATE    40844 - Neuroplasty and/or transposition, cranial nerve, except facial nerve     49504 - Nerve wrap, other nerve procedure    Surgeons      * Rahul Saleh - Primary    Resident/Fellow/Other Assistant:  Surgeons and Role:     * Beth Giraldo DMD, MD - Resident - Assisting     * Serafin Cr MD, DDS - Resident - Assisting    Procedure Summary  Anesthesia: General  ASA: II  Anesthesia Staff: Anesthesiologist: Cecilio Culp MD  C-AA: MARVEL Stovall  Estimated Blood Loss: 75mL  Intra-op Medications:   Administrations occurring from 0715 to 1050 on 24:   Medication Name Total Dose   sodium chloride 0.9 % irrigation solution 1,000 mL   sodium chloride 0.9% infusion Cannot be calculated   thrombin (Human)-fibrinogen-aprotinin-calcium (Tisseel) 10 mL 10 mL              Anesthesia Record               Intraprocedure I/O Totals          Intake    Remifentanil Drip 90.84 mL    The total shown is the total volume documented since Anesthesia Start was filed.    lactated Ringer's 1500.00 mL    Total Intake 1590.84 mL       Output    Est. Blood Loss 75 mL    Total Output 75 mL       Net    Net Volume 1515.84 mL          Specimen:   ID Type Source Tests Collected by Time   1 : NECROTIC BONE LEFT MANDIBE Tissue BONE RESECTION SURGICAL PATHOLOGY EXAM Rahul Saleh DDS 2024 0858   A : NECROTIC BONE LEFT MANDIBLE Tissue MANDIBLE LEFT BIOPSY  AFB CULTURE/SMEAR, FUNGAL CULTURE/SMEAR, TISSUE/WOUND CULTURE/SMEAR Rahul Saleh, DDS 8/22/2024 0836        Staff:   Circulator: Mira Iqbal Person: Sandrine         Drains and/or Catheters:   Closed/Suction Drain Left Neck (Active)   Dressing Status Clean;Dry;Occlusive 08/22/24 1147   Drainage Appearance Dark red 08/22/24 1147   Status To bulb suction 08/22/24 1147   Output (mL) 20 mL 08/22/24 1300       [REMOVED] Open Drain Left;Anterior Neck (Removed)       [REMOVED] Urethral Catheter Non-latex 16 Fr. (Removed)       Tourniquet Times:         Implants:  Implants       Type Name Action Serial No.       implant, mandible Implanted [id number]      cutting guide, w/planning, recon Used, Not Implanted [id number]      cutting guide, recon, extra, small Used, Not Implanted [id number]      2.7x9mm screw Implanted       2.7x17mm screw Implanted       2.7x9mm screw Wasted      Screw DRILL, KLSM 2.2 - [id number] Used, Not Implanted      Neuro Interventional Implant NERVE PROTECTOR, AXOGUARD, 5MM X 40MM - [id number] Implanted               Findings: Necrotic bone of left mandible involving teeth #19, 20, inferior alveolar nerve lateralized and wrapped.     Indications: Aidee Stallings is an 59 y.o. female who is having surgery for Osteomyelitis Unspecified [M86.9].    The patient was seen in the preoperative area. The risks, benefits, complications, treatment options, non-operative alternatives, expected recovery and outcomes were discussed with the patient. The possibilities of reaction to medication, pulmonary aspiration, injury to surrounding structures, bleeding, recurrent infection, the need for additional procedures, failure to diagnose a condition, and creating a complication requiring transfusion or operation were discussed with the patient. The patient concurred with the proposed plan, giving informed consent.  The site of surgery was properly noted/marked if necessary per policy. The patient has been actively  warmed in preoperative area. Preoperative antibiotics have been ordered and given within 1 hours of incision. Venous thrombosis prophylaxis have been ordered including bilateral sequential compression devices and chemical prophylaxis    Procedure Details:   The patient was greeted in the pre-op holding area, where all preoperative risks and complications were reviewed. Later, the patient was brought into the operating room by the anesthesia staff and was placed in the supine position. A time out was performed to confirmed patient's identity and the procedure to be performed. The patient was then induced for general anesthesia and intubated with a nasal endotracheal tube. Following intubation, the nasal endotracheal tube was secured by the oral and maxillofacial surgery team. The patient was prepped and draped in standard oral and maxillofacial surgery fashion. A preincision pause was performed. Attention was then turned to the left neck. A 15 blade was used to create a skin incision from angle to just past midline 2cm inferior to the inferior border of the mandible.  The incision was carried through through fat, and blunt dissection was performed to the level of the platysma. The plastysma was elevated with a mosquito, nerve tested, and divided with sharp incision. The dissection continued through the neck layer by layer with blunt undermining, nerve testing, and sharp incision down to the level of the submandibular gland. The facial vein and artery were encountered, divided, and tied with 2-0 silk ties. The muscular sling of the mandible was divided with bovie down to bone and a periosteal elevator was used to dissect down to inferior border of mandible, and elevated a full thickness flap exposing the necrotic bone of the left mandibular body. A custom plate was adapted to the mandible and predictive holes were drilled. The custom plate was then removed from the mandible. The inferior alveolar nerve was lateralized  using a surgical handpiece to create a window in the lateral bony cortex and free it from the bone. An oval bur on surgical handpiece was used to remove any necrotic bone and a large piece of necrotic bone and tissue were sent for both cultures and pathology. After removing the necrotic bone the distal and proximal portions of the bone were noted to be bleeding and healthy. Intraorally, an incision was made distal to tooth #19 over the area of debridement, extending anteriorly to teeth #19, 20. Teeth #19, 20 were elevated and delivered with forceps. The bony edges were smoothed with surgical handpiece and bone file to ensure a soft contour. Sites were irrigated and the intraoral wound was closed with running and horizontal interrupted 3-0 vicryl suture followed by dermabond and tisseal. In the neck a layer of Tisseel was placed along the corresponding area of the now closed tissue as well. The lateralized nerve at this point was noted to be torn anteriorly and posteriorly and two tacking sutures were placed on the proximal and distal segments of the nerve and their corresponding nerve stumps with 7-0 nylon suture. Following reapproximation of the nerve ends an axogen nerve wrap was used on the proximal and distal segments of the nerve and secured into place with clips. The custom plate was again applied to the mandible and secured with all of the screw holes. Flosseal placed around the mandible. 3-0 vicryl used to reapproximate the periosteum and muscle over the plate. A 10 Sammarinese flat ALEX drain was placed and secured with a 2-0 prolene suture. The platysma was closed with a running 3-0 vicryl suture. The dermal layer was closed with interrupted 4-0 vicryl sutures and the skin was closed with 5-0 fast running suture. The wound was dressed with bacitracin.     The oral cavity was suctioned and throat pack removed. Care of the patient was then transferred over to the anesthesia team. The patient was emerged from  anesthesia, extubated and was taken to PACU in stable condition.      Dr. Saleh was present for all critical portions of the case.    Complications:  None; patient tolerated the procedure well.    Disposition: PACU - hemodynamically stable.  Condition: stable     Additional Details: None    Attending Attestation:     Rahul Saleh  Phone Number: [phone]

## 2024-08-22 NOTE — SIGNIFICANT EVENT
"Postop Note    S  Patient is a 58 y/o female who is POD 0 s/p L mandibular resection with placement of reconstruction plate and neuroplasty with nerve wrap with Dr. Saleh. Patient reports well controlled pain in the post-operative period. Patient reports ambulation, urination, and PO fluid intake.     O  Physical Exam  Physical Exam  Constitutional:       Appearance: Normal appearance.   HENT:      Head: Normocephalic.      Comments: - CN VII intact b/l  - R CN V3 intact, slight hypoesthesia of L CN V3  - Intraoral wounds c/d/l   - Extraoral wounds c/d/I   - Occlusion stable and reproducible       Eyes:      Extraocular Movements: Extraocular movements intact.      Pupils: Pupils are equal, round, and reactive to light.   Neurological:      Mental Status: She is alert.           Last Recorded Vitals  Blood pressure 128/81, pulse 69, temperature 35.9 °C (96.6 °F), temperature source Temporal, resp. rate 20, height 1.702 m (5' 7\"), weight 92.6 kg (204 lb 2.3 oz), SpO2 94%.    Relevant Results  No visits with results within 2 Day(s) from this visit.   Latest known visit with results is:   Lab on 07/01/2024   Component Date Value Ref Range Status    WBC 07/01/2024 7.8  4.4 - 11.3 x10*3/uL Final    nRBC 07/01/2024 0.0  0.0 - 0.0 /100 WBCs Final    RBC 07/01/2024 4.39  4.00 - 5.20 x10*6/uL Final    Hemoglobin 07/01/2024 11.9 (L)  12.0 - 16.0 g/dL Final    Hematocrit 07/01/2024 38.3  36.0 - 46.0 % Final    MCV 07/01/2024 87  80 - 100 fL Final    MCH 07/01/2024 27.1  26.0 - 34.0 pg Final    MCHC 07/01/2024 31.1 (L)  32.0 - 36.0 g/dL Final    RDW 07/01/2024 15.8 (H)  11.5 - 14.5 % Final    Platelets 07/01/2024 352  150 - 450 x10*3/uL Final    Neutrophils % 07/01/2024 57.5  40.0 - 80.0 % Final    Immature Granulocytes %, Automated 07/01/2024 0.1  0.0 - 0.9 % Final    Immature Granulocyte Count (IG) includes promyelocytes, myelocytes and metamyelocytes but does not include bands. Percent differential counts (%) should be " interpreted in the context of the absolute cell counts (cells/UL).    Lymphocytes % 07/01/2024 33.5  13.0 - 44.0 % Final    Monocytes % 07/01/2024 6.6  2.0 - 10.0 % Final    Eosinophils % 07/01/2024 1.8  0.0 - 6.0 % Final    Basophils % 07/01/2024 0.5  0.0 - 2.0 % Final    Neutrophils Absolute 07/01/2024 4.47  1.20 - 7.70 x10*3/uL Final    Percent differential counts (%) should be interpreted in the context of the absolute cell counts (cells/uL).    Immature Granulocytes Absolute, Au* 07/01/2024 0.01  0.00 - 0.70 x10*3/uL Final    Lymphocytes Absolute 07/01/2024 2.61  1.20 - 4.80 x10*3/uL Final    Monocytes Absolute 07/01/2024 0.51  0.10 - 1.00 x10*3/uL Final    Eosinophils Absolute 07/01/2024 0.14  0.00 - 0.70 x10*3/uL Final    Basophils Absolute 07/01/2024 0.04  0.00 - 0.10 x10*3/uL Final    Glucose 07/01/2024 104 (H)  74 - 99 mg/dL Final    Sodium 07/01/2024 140  136 - 145 mmol/L Final    Potassium 07/01/2024 4.2  3.5 - 5.3 mmol/L Final    Chloride 07/01/2024 102  98 - 107 mmol/L Final    Bicarbonate 07/01/2024 29  21 - 32 mmol/L Final    Anion Gap 07/01/2024 13  10 - 20 mmol/L Final    Urea Nitrogen 07/01/2024 15  6 - 23 mg/dL Final    Creatinine 07/01/2024 0.72  0.50 - 1.05 mg/dL Final    eGFR 07/01/2024 >90  >60 mL/min/1.73m*2 Final    Calculations of estimated GFR are performed using the 2021 CKD-EPI Study Refit equation without the race variable for the IDMS-Traceable creatinine methods.  https://jasn.asnjournals.org/content/early/2021/09/22/ASN.0697064226    Calcium 07/01/2024 9.8  8.6 - 10.6 mg/dL Final    Sedimentation Rate 07/01/2024 41 (H)  0 - 30 mm/h Final    Hemoglobin A1C 07/01/2024 5.4  see below % Final    Estimated Average Glucose 07/01/2024 108  Not Established mg/dL Final           Assessment/Plan   Patient is a 60 y/o female who is POD 0 s/p L mandibular resection with placement of reconstruction plate and neuroplasty with nerve wrap. with Dr. Saleh. Patient is progressing well in the  post-operative period. Patient will be continue to be encouraged to ambulate and intake PO fluids in order to progress towards discharge.     Neuro:  - Tylenol 650mg q6h scheduled  - Oxycodone 5/10mg q4h PRN moderate/severe pain    OMFS:  - 60cc syringe and red rubber catheter at bedside  - Elevate head of bed 30 degrees  - Strictly monitor drain output  - Peridex mouth rinse TID  - Unasyn 3g q6h    CV:  - On home/holding home ###    Respiratory:  - Nasal cannula 2L to maintain sats >92%  - Supplemental O2 must be humidified    GI:  - Full liquid diet  - Zofran PRN     Endo:  - Decadron 8mg q8h x3 doses    DVT ppx:  - Lovenox 40mg subcutaneous  - SCD’s  - Encourage OOB to chair and ambulation    Dispo:  Continued recovery at [place] [facility]    [place] OMFS at [phone] with any issues/concerns. If any issue with contacting via pager, please contact Skip Davenport via [employer] call personal number & text.     2nd call to contact via Haiku is Kevin Alcazar  3rd call via Haiku is Serafin Cr     In the case of emergency. Personal contact information is found in [employer] Profiles.      Skip Davenport  PGY1 Oral & Maxillofacial Surgery  OMFS pager: [phone]

## 2024-08-22 NOTE — ANESTHESIA PROCEDURE NOTES
Airway  Date/Time: 8/22/2024 7:31 AM  Urgency: elective    Airway not difficult    Staffing  Performed: MARVEL   Authorized by: Cecilio Culp MD    Performed by: MARVEL Stovall  Patient location during procedure: OR    Indications and Patient Condition  Indications for airway management: anesthesia  Spontaneous Ventilation: absent  Sedation level: deep  Preoxygenated: yes  Patient position: sniffing  Mask difficulty assessment: 2 - vent by mask + OA or adjuvant +/- NMBA    Final Airway Details  Final airway type: endotracheal airway      Successful airway: TRUDY tube  Cuffed: yes   Successful intubation technique: video laryngoscopy  Facilitating devices/methods: Magill forceps  Endotracheal tube insertion site: right naris  Blade: Yue  Blade size: #3  Cormack-Lehane Classification: grade I - full view of glottis  Placement verified by: capnometry   Measured from: nares  ETT to nares (cm): 26  Number of attempts at approach: 1    Additional Comments  Nasal afrin administered pre-induction. Nares dilated with 30 Fr and 32 Fr nasal trumpets prior to intubation.

## 2024-08-23 VITALS
DIASTOLIC BLOOD PRESSURE: 75 MMHG | HEART RATE: 72 BPM | RESPIRATION RATE: 18 BRPM | HEIGHT: 67 IN | OXYGEN SATURATION: 94 % | SYSTOLIC BLOOD PRESSURE: 135 MMHG | TEMPERATURE: 98.4 F | BODY MASS INDEX: 32.04 KG/M2 | WEIGHT: 204.15 LBS

## 2024-08-23 LAB
ABO GROUP (TYPE) IN BLOOD: NORMAL
ANTIBODY SCREEN: NORMAL
FUNGUS SPEC CULT: NORMAL
FUNGUS SPEC FUNGUS STN: NORMAL
RH FACTOR (ANTIGEN D): NORMAL

## 2024-08-23 PROCEDURE — 2500000004 HC RX 250 GENERAL PHARMACY W/ HCPCS (ALT 636 FOR OP/ED): Performed by: STUDENT IN AN ORGANIZED HEALTH CARE EDUCATION/TRAINING PROGRAM

## 2024-08-23 PROCEDURE — 2500000001 HC RX 250 WO HCPCS SELF ADMINISTERED DRUGS (ALT 637 FOR MEDICARE OP): Performed by: STUDENT IN AN ORGANIZED HEALTH CARE EDUCATION/TRAINING PROGRAM

## 2024-08-23 PROCEDURE — 99221 1ST HOSP IP/OBS SF/LOW 40: CPT | Performed by: INTERNAL MEDICINE

## 2024-08-23 RX ORDER — ACETAMINOPHEN 500 MG
1000 TABLET ORAL EVERY 6 HOURS PRN
Qty: 30 TABLET | Refills: 0 | Status: SHIPPED | OUTPATIENT
Start: 2024-08-23 | End: 2024-08-24 | Stop reason: SDUPTHER

## 2024-08-23 RX ORDER — ACETAMINOPHEN 325 MG/1
500 TABLET ORAL EVERY 6 HOURS
Qty: 40 TABLET | Refills: 0 | Status: SHIPPED | OUTPATIENT
Start: 2024-08-23

## 2024-08-23 RX ORDER — IBUPROFEN 600 MG/1
600 TABLET ORAL EVERY 6 HOURS PRN
Status: CANCELLED | OUTPATIENT
Start: 2024-08-23

## 2024-08-23 RX ORDER — ONDANSETRON 4 MG/1
8 TABLET, FILM COATED ORAL EVERY 8 HOURS PRN
Qty: 20 TABLET | Refills: 0 | Status: SHIPPED | OUTPATIENT
Start: 2024-08-23

## 2024-08-23 RX ORDER — ACETAMINOPHEN 325 MG/1
650 TABLET ORAL EVERY 6 HOURS
Qty: 40 TABLET | Refills: 0 | Status: CANCELLED | OUTPATIENT
Start: 2024-08-23

## 2024-08-23 RX ORDER — IBUPROFEN 600 MG/1
600 TABLET ORAL EVERY 6 HOURS PRN
Qty: 28 TABLET | Refills: 0 | Status: SHIPPED | OUTPATIENT
Start: 2024-08-23 | End: 2024-08-24 | Stop reason: SDUPTHER

## 2024-08-23 RX ORDER — AMOXICILLIN 250 MG
1 CAPSULE ORAL DAILY
Qty: 3 TABLET | Refills: 0 | Status: SHIPPED | OUTPATIENT
Start: 2024-08-23 | End: 2024-08-24 | Stop reason: SDUPTHER

## 2024-08-23 RX ORDER — CHLORHEXIDINE GLUCONATE ORAL RINSE 1.2 MG/ML
15 SOLUTION DENTAL 3 TIMES DAILY
Qty: 15 ML | Refills: 0 | Status: SHIPPED | OUTPATIENT
Start: 2024-08-23

## 2024-08-23 RX ORDER — BACITRACIN ZINC 500 UNIT/G
OINTMENT (GRAM) TOPICAL 2 TIMES DAILY
Qty: 28.4 G | Refills: 15 | Status: SHIPPED | OUTPATIENT
Start: 2024-08-23 | End: 2024-08-24 | Stop reason: SDUPTHER

## 2024-08-23 RX ORDER — BACITRACIN ZINC 500 UNIT/G
OINTMENT (GRAM) TOPICAL 2 TIMES DAILY
Qty: 14 G | Refills: 0 | Status: SHIPPED | OUTPATIENT
Start: 2024-08-23

## 2024-08-23 RX ORDER — BACITRACIN 500 [USP'U]/G
OINTMENT TOPICAL 3 TIMES DAILY
OUTPATIENT
Start: 2024-08-23

## 2024-08-23 RX ORDER — CHLORHEXIDINE GLUCONATE ORAL RINSE 1.2 MG/ML
15 SOLUTION DENTAL 3 TIMES DAILY
Qty: 15 ML | Refills: 0 | Status: CANCELLED | OUTPATIENT
Start: 2024-08-23

## 2024-08-23 RX ORDER — OXYCODONE HYDROCHLORIDE 5 MG/1
5 TABLET ORAL EVERY 6 HOURS PRN
Qty: 15 TABLET | Refills: 0 | Status: SHIPPED | OUTPATIENT
Start: 2024-08-23 | End: 2024-08-24 | Stop reason: ENTERED-IN-ERROR

## 2024-08-23 RX ORDER — ONDANSETRON 4 MG/1
8 TABLET, FILM COATED ORAL EVERY 8 HOURS PRN
Qty: 20 TABLET | Refills: 0 | Status: SHIPPED | OUTPATIENT
Start: 2024-08-23 | End: 2024-08-24 | Stop reason: SDUPTHER

## 2024-08-23 RX ORDER — CHLORHEXIDINE GLUCONATE ORAL RINSE 1.2 MG/ML
15 SOLUTION DENTAL AS NEEDED
Qty: 473 ML | Refills: 0 | Status: SHIPPED | OUTPATIENT
Start: 2024-08-23 | End: 2024-08-24 | Stop reason: SDUPTHER

## 2024-08-23 RX ORDER — AMOXICILLIN AND CLAVULANATE POTASSIUM 875; 125 MG/1; MG/1
875 TABLET, FILM COATED ORAL 2 TIMES DAILY
Qty: 84 TABLET | Refills: 0 | Status: SHIPPED | OUTPATIENT
Start: 2024-08-23 | End: 2024-10-04

## 2024-08-23 RX ORDER — AMOXICILLIN 250 MG
1 CAPSULE ORAL DAILY
Qty: 4 TABLET | Refills: 0 | Status: SHIPPED | OUTPATIENT
Start: 2024-08-23

## 2024-08-23 RX ORDER — AMOXICILLIN AND CLAVULANATE POTASSIUM 875; 125 MG/1; MG/1
875 TABLET, FILM COATED ORAL 2 TIMES DAILY
Qty: 28 TABLET | Refills: 0 | Status: SHIPPED | OUTPATIENT
Start: 2024-08-23 | End: 2024-08-24 | Stop reason: SDUPTHER

## 2024-08-23 RX ORDER — OXYCODONE HYDROCHLORIDE 5 MG/1
5 TABLET ORAL EVERY 6 HOURS PRN
Qty: 15 TABLET | Refills: 0 | Status: SHIPPED | OUTPATIENT
Start: 2024-08-23

## 2024-08-23 RX ORDER — IBUPROFEN 600 MG/1
600 TABLET ORAL EVERY 6 HOURS PRN
Qty: 40 TABLET | Refills: 0 | Status: SHIPPED | OUTPATIENT
Start: 2024-08-23

## 2024-08-23 ASSESSMENT — PAIN DESCRIPTION - LOCATION: LOCATION: JAW

## 2024-08-23 ASSESSMENT — PAIN DESCRIPTION - ORIENTATION: ORIENTATION: LEFT

## 2024-08-23 ASSESSMENT — PAIN SCALES - GENERAL: PAINLEVEL_OUTOF10: 2

## 2024-08-23 NOTE — CARE PLAN
The patient's goals for the shift include get home    The clinical goals for the shift include pt pain will remain below 4/10

## 2024-08-23 NOTE — CARE PLAN
The patient's goals for the shift include go home    The clinical goals for the shift include pt pain will remain below 4/10      Problem: Pain - Adult  Goal: Verbalizes/displays adequate comfort level or baseline comfort level  Outcome: Progressing     Problem: Safety - Adult  Goal: Free from fall injury  Outcome: Progressing     Problem: Discharge Planning  Goal: Discharge to home or other facility with appropriate resources  Outcome: Progressing     Problem: Chronic Conditions and Co-morbidities  Goal: Patient's chronic conditions and co-morbidity symptoms are monitored and maintained or improved  Outcome: Progressing     Problem: Pain  Goal: Takes deep breaths with improved pain control throughout the shift  Outcome: Progressing  Goal: Turns in bed with improved pain control throughout the shift  Outcome: Progressing  Goal: Walks with improved pain control throughout the shift  Outcome: Progressing  Goal: Performs ADL's with improved pain control throughout shift  Outcome: Progressing  Goal: Participates in PT with improved pain control throughout the shift  Outcome: Progressing  Goal: Free from opioid side effects throughout the shift  Outcome: Progressing  Goal: Free from acute confusion related to pain meds throughout the shift  Outcome: Progressing     Problem: Fall/Injury  Goal: Not fall by end of shift  Outcome: Progressing  Goal: Be free from injury by end of the shift  Outcome: Progressing  Goal: Verbalize understanding of personal risk factors for fall in the hospital  Outcome: Progressing  Goal: Verbalize understanding of risk factor reduction measures to prevent injury from fall in the home  Outcome: Progressing  Goal: Use assistive devices by end of the shift  Outcome: Progressing  Goal: Pace activities to prevent fatigue by end of the shift  Outcome: Progressing

## 2024-08-23 NOTE — CONSULTS
"Inpatient consult to Infectious Diseases  Consult performed by: Jf Schulte MD  Consult ordered by: Rahul Saleh DDS        Primary MD: MOSES Hanna-CNP  Reason For Consult Co-management for left mandibular osteomyelitis     History Of Present Illness  Aidee Stallings is a 59 y.o. female with PMH of HTN presented for scheduled repair of left mandibular osteomyelitis on 8/22.     From the chart review, patient initially presented with numbness and swelling on left side of chin to the ED, and had CT (5/3/24) showing left submandibular sialadenitis, and was discharged with Keflex for 10 days (non-diagnostic for osteomyelitis). However, his panoramic Xray (6/27/24) showed osteomyelitis, and CT scan showed left mandibular osteomyelitis. On 8/22, patient underwent L mandibular resection with placement of reconstruction plate and neuroplasty with nerve wrap. Initiated with unasyn. Pathology/Culture from bone biopsy are pending.     Past Medical History  She has a past medical history of Hypertension.    She has no past medical history of MS (multiple sclerosis) (Multi) or Sickle cell disease (Multi).    Surgical History  She has a past surgical history that includes Colonoscopy and Ankle surgery (Right).  Allergies  Patient has no known allergies.   Objective  Range of Vitals (last 24 hours)  Heart Rate:  [62-92]   Temp:  [35.9 °C (96.6 °F)-37.1 °C (98.8 °F)]   Resp:  [10-20]   BP: (115-140)/(60-81)   Height:  [170.2 cm (5' 7\")]   Weight:  [92.6 kg (204 lb 2.3 oz)]   SpO2:  [92 %-99 %]   Daily Weight  08/22/24 : 92.6 kg (204 lb 2.3 oz)    Body mass index is 31.97 kg/m².     Physical Exam  General: alert, able to answer questions  HEENT: no conjunctival injection. anicteric. Swelling in mandible area with surgical site with a drain tube  CVS: RRR. Normal S1 and S2. No m/r/g.   RESP: ctab no w/r/r, no increased wob  Abd: Soft and lax. ND.   Ext: No swelling of the LE b/l.   Neuro: Answers questions " appropriately.  Integumentary: no obvious lesions   Rheumatologic: No joint swelling or edema  Relevant Results    Labs  Results from last 72 hours   Lab Units 08/22/24  1835   WBC AUTO x10*3/uL 16.7*   HEMOGLOBIN g/dL 11.5*   HEMATOCRIT % 36.4   PLATELETS AUTO x10*3/uL 348     Results from last 72 hours   Lab Units 08/22/24  1836   SODIUM mmol/L 137   POTASSIUM mmol/L 4.5   CHLORIDE mmol/L 100   CO2 mmol/L 25   BUN mg/dL 12   CREATININE mg/dL 0.77   GLUCOSE mg/dL 120*   CALCIUM mg/dL 9.0   ANION GAP mmol/L 17   EGFR mL/min/1.73m*2 90   PHOSPHORUS mg/dL 4.0     Results from last 72 hours   Lab Units 08/22/24  1836   ALBUMIN g/dL 3.8     Estimated Creatinine Clearance: 94.2 mL/min (by C-G formula based on SCr of 0.77 mg/dL).  Sedimentation Rate   Date Value Ref Range Status   07/01/2024 41 (H) 0 - 30 mm/h Final   Microbiology  8/22 OR     Antimicrobial agents  8/22- Unasyn    Assessment/Plan  # left mandibular osteomyelitis     A 60 yo female with multiple hx of repairs of decayed teeth developed left mandibular osteomyelitis s/p L mandibular resection with placement of reconstruction plate. Initiated with unasyn. No associated with cancer or medication. Pathology/Culture from bone biopsy are pending.     Microbiology astorga, unasyn should cover oral pathogens that causes a jaw osteomyelitis. Can switch at discharge to oral augmentin given sufficient blood supply for this lesion from anatomy standpoint. Will await culture finalized.  Anticipate a 6 week course.     Regarding chronics suppression, would be reasonable with follow-up team because she has hardware remained and hx of multiple hx of decayed teeth.     Recommendations  -Continue IV unasyn 3g q6h while in house  -Switch at discharge to oral augmentin 875/125 mg BID for 6 weeks   -No need to follow up with ID    Discussed with the team and Dr. Lopez. Please text me via Epic chat if you have any questions or concerns regarding this patient. ID will sign  off.    Jf Schulte MD  ID fellow [id number]  Team A   ID pager [phone]

## 2024-08-23 NOTE — PROGRESS NOTES
"Patient is a 60 y/o female who is HOD 2 POD 1 s/p L mandibular resection with placement of reconstruction plate and neuroplasty with nerve wrap with Dr. Saleh. No acute events overnight.    Subjective   Patient reports well controlled pain in the post-operative period. Patient reports ambulation, urination, and PO fluid intake.        Objective     Physical Exam  HENT:      Head:      Comments: Left sided facial swelling  CN VII intact b/l  R CN V3 intact, slight hypoesthesia of L CN V3  Otherwise cranial nerve V equal and intact b/l  Intraoral wounds c/d/l   Extraoral wounds c/d/I   Occlusion stable and reproducible        Mouth/Throat:      Mouth: Mucous membranes are moist.   Eyes:      Pupils: Pupils are equal, round, and reactive to light.   Abdominal:      General: Abdomen is flat.      Palpations: Abdomen is soft.   Skin:     General: Skin is warm and dry.         Last Recorded Vitals  Blood pressure 132/75, pulse 70, temperature 37.1 °C (98.8 °F), temperature source Temporal, resp. rate 18, height 1.702 m (5' 7\"), weight 92.6 kg (204 lb 2.3 oz), SpO2 94%.  Intake/Output last 3 Shifts:  I/O last 3 completed shifts:  In: 3298.3 (35.6 mL/kg) [I.V.:3198.3 (34.5 mL/kg); IV Piggyback:100]  Out: 705 (7.6 mL/kg) [Urine:600 (0.2 mL/kg/hr); Drains:30; Blood:75]  Weight: 92.6 kg     Relevant Results        Results for orders placed or performed during the hospital encounter of 08/22/24 (from the past 24 hour(s))   CBC   Result Value Ref Range    WBC 16.7 (H) 4.4 - 11.3 x10*3/uL    nRBC 0.0 0.0 - 0.0 /100 WBCs    RBC 4.30 4.00 - 5.20 x10*6/uL    Hemoglobin 11.5 (L) 12.0 - 16.0 g/dL    Hematocrit 36.4 36.0 - 46.0 %    MCV 85 80 - 100 fL    MCH 26.7 26.0 - 34.0 pg    MCHC 31.6 (L) 32.0 - 36.0 g/dL    RDW 14.3 11.5 - 14.5 %    Platelets 348 150 - 450 x10*3/uL   Type And Screen   Result Value Ref Range    ABO TYPE O     Rh TYPE POS     ANTIBODY SCREEN NEG    Renal function panel   Result Value Ref Range    Glucose 120 (H) 74 " - 99 mg/dL    Sodium 137 136 - 145 mmol/L    Potassium 4.5 3.5 - 5.3 mmol/L    Chloride 100 98 - 107 mmol/L    Bicarbonate 25 21 - 32 mmol/L    Anion Gap 17 10 - 20 mmol/L    Urea Nitrogen 12 6 - 23 mg/dL    Creatinine 0.77 0.50 - 1.05 mg/dL    eGFR 90 >60 mL/min/1.73m*2    Calcium 9.0 8.6 - 10.6 mg/dL    Phosphorus 4.0 2.5 - 4.9 mg/dL    Albumin 3.8 3.4 - 5.0 g/dL             Assessment/Plan   Patient is a 58 y/o female who is HOD 2 POD 1 s/p L mandibular resection with placement of reconstruction plate and neuroplasty with nerve wrap. with Dr. Saleh. Patient is progressing well in the post-operative period.      Neuro:  - Tylenol 650mg q6h scheduled  - Oxycodone 5/10mg q4h PRN moderate/severe pain     OMFS:  - 60cc syringe and red rubber catheter at bedside  - Elevate head of bed 30 degrees  - Strictly monitor drain output  - Peridex mouth rinse TID  - Unasyn 3g q6h     CV:  - On home/holding home ###       GI:  - Full liquid diet  - Zofran PRN      Endo:  - Decadron 8mg q8h x3 doses     DVT ppx:  - Lovenox 40mg subcutaneous  - SCD’s  - Encourage OOB to chair and ambulation     Dispo:  Patient will continue recovery on floor 9. Patient has been encouraged to continue PO intake of liquid diet. Drain output will be assessed along with assessment of discharge today after ID team provides recommendations.     [place] OMFS at [phone] with any issues/concerns. If any issue with contacting via pager, please contact Dallas Beyer via [employer] call personal number & text.     2nd call to contact via Haiku is Kevin Alcazar  3rd call via Haiku is Serafin Cr     In the case of emergency. Personal contact information is found in [employer] Profiles.      Dallas Beyer  PGY1 Oral & Maxillofacial Surgery  OMFS pager: [phone]    Assessment & Plan  Osteomyelitis, unspecified site, unspecified type (Multi)

## 2024-08-24 LAB
ACID FAST STN SPEC: NORMAL
BACTERIA SPEC CULT: NORMAL
GRAM STN SPEC: NORMAL
GRAM STN SPEC: NORMAL
MYCOBACTERIUM SPEC CULT: NORMAL

## 2024-08-24 NOTE — DISCHARGE INSTRUCTIONS
MEDICATIONS  ° Pain medication should be taken only during the time that you feel significant discomfort. If the pain is severe, the prescription medication can be used. However, if only mild discomfort is experienced, try to use a less potent, over the counter medication such as Advil (ibuprofen and/or Tylenol (Acetaminophen). These can be taken in crushed tablets or in liquid form.  ° Antibiotics: This medicine should be taken at the appropriate interval as described on the bottle. Be sure not to miss any doses and take the medicine until it is gone.    DRINKING  ° Keep hydrated by drinking at least 8-9 glasses of liquids a day. This is extremely important to prevent dehydration. Signs of dehydration are: dry mouth, dark yellow urine in small amounts, and dizziness with change in position or increased feeling of weakness/tiredness.  ° Do not drink raw milk products for two days after surgery. Raw milk adheres the incision areas and may promote infections. Processed milk products such as canned milk based drinks or cooked milk as in creamed soups are fine.    DIET  ° For the first 5 to 7 days after surgery: During this time the diet should be a pureed or blenderized diet.  ° While this can include soups, and food with baby food consistency, this type of diet does not necessarily mean foods that are of liquid consistency.  ° You can also eat foods such as mashed potatoes, applesauce, oatmeal and pudding. It may be difficult to open your mouth wide enough to get a spoon inside.  A small baby sized spoon may be helpful.  ° Some sort of diet supplement such as Boost, Ensure, or similar substitutes may be used to increase calorie intake.  ° Your diet will be slowly advanced accordingly during follow up.     WOUND CARE  ° We recommend using ice packs on your face for the first 48 hours to reduce swelling and bruising over the affected areas. We recommend 20 minutes on and 20 minutes off. Make sure to wrap the ice pack in  a towel - do not apply directly to skin.  Cold or heat directly on numb areas can lead to permanent damage of the skin.  ° Keep your head elevated, at least 30 degrees, to minimize swelling. This may require you to sleep in a reclining chair or using several pillows in bed.    PHYSICAL ACTIVITY  ° Following surgery you will find that your energy level is much lower. This will take some time to return to normal.  ° Although you just had surgery, it is important that you do not stay in bed while awake to minimize the chances of leg clots. We highly encourage occasional standing and walking as tolerated.  ° Try to transfer to a chair instead and walk throughout the house as much as you can tolerate. Try to be as active as possible. The swelling will worsen over the next 3-4 days after surgery and is expected. The swelling will begin to subside over the next few days.  ° Physical exercise such as walking or running can begin 2 or 3 weeks after surgery.  ° When you attempt to return to normal physical activity start slowly and work up to your normal level.  ° It is important that you take deep breaths after surgery to help prevent development of pneumonia. Try to take 10-20 deep breaths every hour while you are awake. Encourage yourself to gently cough if you feel mucous accumulating in the back of your throat or lungs.     PLEASE REPORT ANY OF THE FOLLOWING TO OUR TEAM:  ° Sudden or excessive bleeding, swelling, or bruising.  ° Any itching, rash, or adverse reaction to medication.  ° Fever over 100 degrees Fahrenheit.  ° Drainage or discharge from the incision sites (other than blood).  ° Any injury to the face over the next 6 weeks.    Drain instructions:  You have a bulb drain in place that you will be sent home with. To empty the drain, open cap and the top and tip into cup to empty. Squeeze drain then replace the cap.   Please empty the drain and record its output  daily and bring these numbers to your follow up  appointment.  This drain is sutured into place. Please keep it dry and change the dressing sponge around the drain daily or it get soiled.     How to care for your Osman-Tatum drain:    When you leave the hospital, care for your Osman-Tatum drain by:    Milking (stripping) your tubing to help move clots.    Emptying your drain 2 times a day. Do this once in the morning and once in the evening. Write down the amount of drainage on your Osman-Tatum drainage log at the end of this resource. If you have more than 1 drain, measure and write down the drainage of each one separately. Do not add them together.    Caring for your insertion site.    Checking for problems.    Follow these instructions to empty your Osman-Tatum drain:    Unplug the stopper on top of the bulb. This will make the bulb expand. Do not touch the inside of the stopper or the inner area of the opening on the bulb.     Turn the bulb upside down and gently squeeze it. Pour the drainage into the measuring container (see Figure 2).      Turn the bulb right side up. Squeeze the bulb until your fingers feel the palm of your hand. All the air should come out of the bulb.    Keep squeezing the bulb while you re-plug the stopper. Check to see that the bulb stays fully compressed to ensure a constant gentle suction. The stopper must be closed for the drain to work.    Attach the drainage bulb to your surgical bra or wrap, if you’re wearing one. Use the plastic loop or Velcro® straps at the bottom. Do not let the drain dangle. It may be helpful to hold your drain in a yury pack or belt bag.    Check the amount and color of drainage in the measuring container. The first couple of days after surgery, the fluid may be a dark red color. This is normal. As you continue to heal, it may look pink or pale yellow.    Write down the amount (in mL) and color of your drainage on your Osman-Tatum drainage log.    Flush the drainage down the toilet and rinse the  measuring container with water.    At the end of each day, add the total amount of drainage you had for the day. Write the amount in the last column of the drainage log. If you have more than 1 drain, measure and record each one separately. Do not add them together.    Please keep a log of your drainage output and bring it to your follow-up visit with your surgeon.     If you have any questions or concerns please contact us during regular office hours ([phone]).  For any emergency or after hours questions do not hesitate to contact the resident on call. You may call [phone] for the hospital  and ask for the ``Oral Surgeon On Call´´.    Please remember that you have a follow-up appointment with us in our outpatient clinic on 8/30/24 @ 10:00 AM    Department of Oral & Maxillofacial Surgery  [address] ([facility])  [place] [address]    Office phone number: [phone].  Office fax number: [phone].  Team Pager: [phone].  Patients can contact the [email or web address] through the hosptial  [phone].

## 2024-08-24 NOTE — DISCHARGE SUMMARY
Discharge Diagnosis  Osteomyelitis, unspecified site, unspecified type (Multi)    Issues Requiring Follow-Up  ALEX drain output needs to be strictly monitored.   Pain management.    Test Results Pending At Discharge  Pending Labs       Order Current Status    AFB Culture/Smear In process    Surgical Pathology Exam In process    Fungal Culture/Smear Preliminary result    Tissue/Wound Culture/Smear Preliminary result            Hospital Course  Aidee Stallings is a 59 y.o. female presenting with left mandibular osteomyelitis. L mandibular resection with placement of reconstruction plate and neuroplasty with nerve wrap with Dr. Saleh. Patient reported no acute events overnight. Patient reported well controlled pain in the post-operative period. Patient reported ambulation, urination, and PO fluid intake.        Pertinent Physical Exam At Time of Discharge  Physical Exam    HENT:      Head:      Comments: Left sided facial swelling  CN VII intact b/l  R CN V3 intact, slight hypoesthesia of L CN V3  Otherwise cranial nerve V equal and intact b/l  Intraoral wounds c/d/l   Extraoral wounds c/d/I   Occlusion stable and reproducible         Mouth/Throat:      Mouth: Mucous membranes are moist.   Eyes:      Pupils: Pupils are equal, round, and reactive to light.   Abdominal:      General: Abdomen is flat.      Palpations: Abdomen is soft.   Skin:     General: Skin is warm and dry.    Home Medications     Medication List      START taking these medications     * acetaminophen 500 mg tablet; Commonly known as: Tylenol; Take 2   tablets (1,000 mg) by mouth every 6 hours if needed for mild pain (1 - 3).   * acetaminophen 325 mg tablet; Commonly known as: Tylenol; Take 1.5   tablets (487.5 mg) by mouth every 6 hours.   * amoxicillin-pot clavulanate 875-125 mg tablet; Commonly known as:   Augmentin; Take 1 tablet (875 mg) by mouth 2 times a day for 14 days.   * amoxicillin-pot clavulanate 875-125 mg tablet; Commonly known as:    Augmentin; Take 1 tablet (875 mg) by mouth 2 times a day.   * bacitracin 500 unit/gram ointment; Apply topically 2 times a day.   * bacitracin 500 unit/gram ointment; Apply topically 2 times a day.   * chlorhexidine 0.12 % solution; Commonly known as: Peridex; Use 15 mL   in the mouth or throat if needed for wound care.   * chlorhexidine 0.12 % solution; Commonly known as: Peridex; Use 15 mL   in the mouth or throat 3 times a day.   * ibuprofen 600 mg tablet; Take 1 tablet (600 mg) by mouth every 6 hours   if needed for mild pain (1 - 3) for up to 7 days.   * ibuprofen 600 mg tablet; Take 1 tablet (600 mg) by mouth every 6 hours   if needed for mild pain (1 - 3).   * ondansetron 4 mg tablet; Commonly known as: Zofran; Take 2 tablets (8   mg) by mouth every 8 hours if needed for nausea or vomiting.   * ondansetron 4 mg tablet; Commonly known as: Zofran; Take 2 tablets (8   mg) by mouth every 8 hours if needed for nausea or vomiting.   * oxyCODONE 5 mg immediate release tablet; Commonly known as:   Roxicodone; Take 1 tablet (5 mg) by mouth every 6 hours if needed for   severe pain (7 - 10) for up to 7 days.   * oxyCODONE 5 mg immediate release tablet; Commonly known as:   Roxicodone; Take 1 tablet (5 mg) by mouth every 6 hours if needed for   severe pain (7 - 10).   * sennosides-docusate sodium 8.6-50 mg tablet; Commonly known as:   Carrie-Colace; Take 1 tablet by mouth once daily for 3 days.   * sennosides-docusate sodium 8.6-50 mg tablet; Commonly known as:   Carrie-Colace; Take 1 tablet by mouth once daily.  * This list has 16 medication(s) that are the same as other medications   prescribed for you. Read the directions carefully, and ask your doctor or   other care provider to review them with you.     CONTINUE taking these medications     amLODIPine 5 mg tablet; Commonly known as: Norvasc   hydroCHLOROthiazide 25 mg tablet; Commonly known as: HYDRODiuril   metoprolol succinate XL 50 mg 24 hr tablet; Commonly known  as: Toprol-XL     ASK your doctor about these medications     methylPREDNISolone 4 mg tablets; Commonly known as: Medrol Dospak;   Follow schedule on package instructions       Outpatient Follow-Up    Patient is to be seen for a follow-up appointment in our outpatient clinic on 8/30/24 @ 10:00 am for drain removal.    Department of Oral & Maxillofacial Surgery  [address] ([facility] [place])  [place] [address]    Office phone number: [phone].  Office fax number: [phone].  Team Pager: [phone].  Patients can contact the [id number] through the hosptial  [phone].     [place] [facility] [place] [phone] with any issues/concerns. If any issue with contacting via pager, please contact Grace Steel via Lumatic call personal number & text.     2nd call to contact via Haiku is Kevin Alcazar  3rd call via Haiku is Serafin Cr     In the case of emergency. Personal contact information is found in [employer] Profiles.      Grace Steel  PGY1 Oral & Maxillofacial Surgery  OMFS pager: [phone]  Grace Steel DDS

## 2024-08-26 ENCOUNTER — PATIENT OUTREACH (OUTPATIENT)
Dept: CARE COORDINATION | Facility: CLINIC | Age: 59
End: 2024-08-26
Payer: COMMERCIAL

## 2024-08-26 NOTE — PROGRESS NOTES
"Outreach call to patient to support a smooth transition of care from recent admission.  Spoke with patient, reviewed discharge medications, discharge instructions, assessed social needs, and provided education on importance of follow-up appointment with provider. Enrolled patient in [employer] chatbot for additional support and education through transition period.  Will continue to monitor through transition period.      Patient doing \"well say them today for the drain\" Have my meds at home.   No further concerns on this day.   Cosign Needed  Discharge Diagnosis  Osteomyelitis, unspecified site, unspecified type (Multi)     Issues Requiring Follow-Up  ALEX drain output needs to be strictly monitored.   Pain management.     Test Results Pending At Discharge  Pending Labs                 Francine Goode , RN   Nurse Care Manager   [facility] Accountable Care Department   [phone]   "

## 2024-08-27 LAB
FUNGUS SPEC CULT: NORMAL
FUNGUS SPEC FUNGUS STN: NORMAL

## 2024-08-28 LAB
ACID FAST STN SPEC: NORMAL
MYCOBACTERIUM SPEC CULT: NORMAL

## 2024-08-30 LAB
B-LACTAMASE ORGANISM ISLT: NEGATIVE
BACTERIA SPEC CULT: NORMAL
GRAM STN SPEC: NORMAL
GRAM STN SPEC: NORMAL

## 2024-09-03 LAB
FUNGUS SPEC CULT: NORMAL
FUNGUS SPEC FUNGUS STN: NORMAL

## 2024-09-04 LAB
ACID FAST STN SPEC: NORMAL
MYCOBACTERIUM SPEC CULT: NORMAL

## 2024-09-06 LAB
LABORATORY COMMENT REPORT: NORMAL
PATH REPORT.FINAL DX SPEC: NORMAL
PATH REPORT.GROSS SPEC: NORMAL
PATH REPORT.RELEVANT HX SPEC: NORMAL
PATH REPORT.TOTAL CANCER: NORMAL

## 2024-09-09 LAB
FUNGUS SPEC CULT: NORMAL
FUNGUS SPEC FUNGUS STN: NORMAL

## 2024-09-11 LAB
ACID FAST STN SPEC: NORMAL
MYCOBACTERIUM SPEC CULT: NORMAL

## 2024-09-19 LAB
ACID FAST STN SPEC: NORMAL
MYCOBACTERIUM SPEC CULT: NORMAL

## 2024-09-25 ENCOUNTER — PATIENT OUTREACH (OUTPATIENT)
Dept: CARE COORDINATION | Facility: CLINIC | Age: 59
End: 2024-09-25
Payer: COMMERCIAL

## 2024-09-25 LAB
ACID FAST STN SPEC: NORMAL
MYCOBACTERIUM SPEC CULT: NORMAL

## 2024-09-25 NOTE — PROGRESS NOTES
Outreach call to patient to check in 30 days after hospital discharge to support smooth transition of care.  This phone number sounded like a fax telephone beep.    Future Appointments      NOV 25 2024 11:45 AM - Office Visit  [facility] - Jose Guadalupe Conroy MD        [facility]- last colonoscopy in [facility] in 2017    Please discuss with your primary care doctor if you qualify for a flu vaccine.         Thank you,   Francine Goode , RN   Nurse Care Manager   [facility] Department   [phone]

## 2024-10-02 LAB
ACID FAST STN SPEC: NORMAL
MYCOBACTERIUM SPEC CULT: NORMAL

## 2024-10-09 LAB
ACID FAST STN SPEC: NORMAL
MYCOBACTERIUM SPEC CULT: NORMAL

## 2024-10-16 LAB
ACID FAST STN SPEC: NORMAL
MYCOBACTERIUM SPEC CULT: NORMAL

## (undated) DEVICE — EVACUATOR, WOUND, SUCTION, CLOSED, JACKSON-PRATT, 100 CC, SILICONE

## (undated) DEVICE — REST, HEAD, BAGEL, 9 IN

## (undated) DEVICE — SUPPLEMENTAL MODEL, HALF

## (undated) DEVICE — CATHETER, URETHRAL, PEZZER, 3 CM HEAD, 36 FR, LATEX

## (undated) DEVICE — TOWEL, SURGICAL, NEURO, O/R, 16 X 26, BLUE, STERILE

## (undated) DEVICE — DRESSING, SPONGE, GAUZE, CURITY, 4 X 4 IN, STERILE

## (undated) DEVICE — CATHETER TRAY, SURESTEP, 16FR, URINE METER W/STATLOCK

## (undated) DEVICE — PAD, GROUNDING, ELECTROSURGICAL, W/9 FT CABLE, POLYHESIVE II, ADULT, LF

## (undated) DEVICE — FLOSEAL, MATRIX, HEMOSTATIC, FULL STERILE PREP, 5ML

## (undated) DEVICE — WOUND SYSTEM, DEBRIDEMENT & CLEANING, O.R DUOPAK

## (undated) DEVICE — SUTURE, SILK, 2-0, 18 IN, BLACK

## (undated) DEVICE — Device

## (undated) DEVICE — BOWL, BASIN, 32 OZ, STERILE

## (undated) DEVICE — MANIFOLD, 4 PORT NEPTUNE STANDARD

## (undated) DEVICE — SPONGE, GAUZE, XRAY DECT, 16 PLY, 4 X 4, W/MASTER DMT,STERILE

## (undated) DEVICE — COVER, CART, 45 X 27 X 48 IN, CLEAR

## (undated) DEVICE — GOLF PENCIL, LF, STERILE

## (undated) DEVICE — BUR, ROUND 3MM OSTEON, ELITE, SOFT TOUCH

## (undated) DEVICE — DRAIN, WOUND, FLAT, HUBLESS, FULL LENGTH PERFORATION, 10 MM X 20 CM, SILICONE

## (undated) DEVICE — SEALER, BIPOLAR, AQUA MANTYS MIS FLEX MINI

## (undated) DEVICE — BANDAGE, GAUZE, CONFORMING, KERLIX, 6 PLY, 4.5 IN X 4.1 YD

## (undated) DEVICE — SPONGE, LAP, XRAY DECT, 18IN X 18IN, W/MASTER DMT, STERILE

## (undated) DEVICE — SUTURE, PLAIN, 5-0, 18 IN, PC1, YELLOW

## (undated) DEVICE — SUTURE, VICRYL, 4-0, 18 IN, UNDYED BR PS-2

## (undated) DEVICE — SUTURE, POLYSORB, 4-0, 18 IN, P12, UNDYED